# Patient Record
Sex: FEMALE | URBAN - METROPOLITAN AREA
[De-identification: names, ages, dates, MRNs, and addresses within clinical notes are randomized per-mention and may not be internally consistent; named-entity substitution may affect disease eponyms.]

---

## 2019-02-14 ENCOUNTER — APPOINTMENT (RX ONLY)
Dept: URBAN - METROPOLITAN AREA CLINIC 51 | Facility: CLINIC | Age: 24
Setting detail: DERMATOLOGY
End: 2019-02-14

## 2019-02-14 DIAGNOSIS — Z87.891 PERSONAL HISTORY OF NICOTINE DEPENDENCE: ICD-10-CM

## 2019-02-14 DIAGNOSIS — D22 MELANOCYTIC NEVI: ICD-10-CM

## 2019-02-14 DIAGNOSIS — L63.8 OTHER ALOPECIA AREATA: ICD-10-CM

## 2019-02-14 DIAGNOSIS — L20.89 OTHER ATOPIC DERMATITIS: ICD-10-CM

## 2019-02-14 PROBLEM — L20.84 INTRINSIC (ALLERGIC) ECZEMA: Status: ACTIVE | Noted: 2019-02-14

## 2019-02-14 PROBLEM — D22.9 MELANOCYTIC NEVI, UNSPECIFIED: Status: ACTIVE | Noted: 2019-02-14

## 2019-02-14 PROCEDURE — 99406 BEHAV CHNG SMOKING 3-10 MIN: CPT

## 2019-02-14 PROCEDURE — ? PRESCRIPTION

## 2019-02-14 PROCEDURE — ? SMOKING CESSATION COUNSELING

## 2019-02-14 PROCEDURE — ? COUNSELING

## 2019-02-14 PROCEDURE — ? REFUSAL OF TREATMENT

## 2019-02-14 PROCEDURE — ? TREATMENT REGIMEN

## 2019-02-14 PROCEDURE — ? NOTED ON EXAM BUT NOT TREATED

## 2019-02-14 PROCEDURE — 99243 OFF/OP CNSLTJ NEW/EST LOW 30: CPT

## 2019-02-14 RX ORDER — TRIAMCINOLONE ACETONIDE 0.1 %
OINTMENT (GRAM) TOPICAL BID
Qty: 1 | Refills: 0 | Status: ERX | COMMUNITY
Start: 2019-02-14

## 2019-02-14 RX ORDER — FLUOCINONIDE 0.5 MG/ML
SOLUTION TOPICAL BID
Qty: 1 | Refills: 0 | Status: ERX | COMMUNITY
Start: 2019-02-14

## 2019-02-14 RX ADMIN — FLUOCINONIDE 1: 0.5 SOLUTION TOPICAL at 00:00

## 2019-02-14 RX ADMIN — Medication 1: at 00:00

## 2019-02-14 NOTE — PROCEDURE: MIPS QUALITY
Detail Level: Detailed
Quality 226: Preventive Care And Screening: Tobacco Use: Screening And Cessation Intervention: Tobacco Screening not Performed for Medical Reasons
Quality 130: Documentation Of Current Medications In The Medical Record: Current Medications Documented
Quality 110: Preventive Care And Screening: Influenza Immunization: Influenza Immunization Administered during Influenza season
Quality 431: Preventive Care And Screening: Unhealthy Alcohol Use - Screening: Patient screened for unhealthy alcohol use using a single question and scores less than 2 times per year